# Patient Record
Sex: MALE | Race: WHITE
[De-identification: names, ages, dates, MRNs, and addresses within clinical notes are randomized per-mention and may not be internally consistent; named-entity substitution may affect disease eponyms.]

---

## 2018-09-19 ENCOUNTER — HOSPITAL ENCOUNTER (EMERGENCY)
Dept: HOSPITAL 25 - ED | Age: 48
Discharge: HOME | End: 2018-09-19
Payer: COMMERCIAL

## 2018-09-19 VITALS — DIASTOLIC BLOOD PRESSURE: 112 MMHG | SYSTOLIC BLOOD PRESSURE: 160 MMHG
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
## 2018-09-19 DIAGNOSIS — Z88.0: ICD-10-CM
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
## 2018-09-19 DIAGNOSIS — S05.01XA: Primary | ICD-10-CM

## 2018-09-19 PROCEDURE — 99281 EMR DPT VST MAYX REQ PHY/QHP: CPT

## 2018-09-19 NOTE — ED
Throat Pain/Nasal Congestion





- HPI Summary


HPI Summary: 


Patient is a 47-year-old otherwise healthy male presenting to the ED with right 

eye pain 2 days.  He states he felt something fly into the eye (such as an 

insect) 2 days ago and immediately felt pain.  He states this resolved after 

several minutes, and the following day symptoms worsened with erythema, 

injection and tearing from the eye.  He states after irrigating the eye, he saw 

a black foreign body come from the eye and again felt improved.  However when 

he awoke this morning, symptoms returned.  Pain is not worse or better with 

blinking, not worse or better with light.  He was wearing contacts at the time 

but has been using glasses past 2 days after he felt symptoms.








- History of Current Complaint


Chief Complaint: EDEyeProblem


Time Seen by Provider: 09/19/18 21:28


Hx Obtained From: Patient


Onset/Duration: Sudden Onset


Severity: Moderate





- Epiglottits Risk Factors


Epiglottis Risk Factors: Negative





- Allergies/Home Medications


Allergies/Adverse Reactions: 


 Allergies











Allergy/AdvReac Type Severity Reaction Status Date / Time


 


Penicillins Allergy  Anaphylatic Verified 09/19/18 20:52





   Shock  














PMH/Surg Hx/FS Hx/Imm Hx


Previously Healthy: Yes





- Immunization History


Hx Pertussis Vaccination: No


Immunizations Up to Date: Yes


Infectious Disease History: No


Infectious Disease History: 


   Denies: Traveled Outside the [place] in Last 30 Days





- Social History


Occupation: Employed Full-time


Lives: With Family


Alcohol Use: Occasionally


Hx Substance Use: No


Substance Use Type: Reports: None


Hx Tobacco Use: No


Smoking Status (MU): Never Smoked Tobacco





Review of Systems


Constitutional: Negative


Negative: Fever, Chills, Skin Diaphoresis


Positive: Photophobia, Blurred Vision, Drainage, Erythema.  Negative: Diplopia


Negative: Palpitations, Chest Pain


Negative: Shortness Of Breath, Cough


Genitourinary: Negative


Positive: no symptoms reported, see HPI


Negative: Rash, Bruising


Neurological: Negative


All Other Systems Reviewed And Are Negative: Yes





Physical Exam


Triage Information Reviewed: Yes


Vital Signs On Initial Exam: 


 Initial Vitals











Temp Pulse Resp BP Pulse Ox


 


 98.6 F   80   18   160/112   98 


 


 09/19/18 20:46  09/19/18 20:46  09/19/18 20:46  09/19/18 20:46  09/19/18 20:46











Vital Signs Reviewed: Yes


Appearance: Positive: Well-Appearing, No Pain Distress


Skin: Positive: Warm, Skin Color Reflects Adequate Perfusion


Head/Face: Positive: Normal Head/Face Inspection


Eyes: Positive: Conjunctiva Inflammed


Neck: Positive: Nontender, No Lymphadenopathy


Respiratory/Lung Sounds: Positive: Clear to Auscultation, Breath Sounds Present


Cardiovascular: Positive: RRR, Pulses are Symmetrical in both Upper and Lower 

Extremities


Musculoskeletal: Positive: Normal, Strength/ROM Intact


Neurological: Positive: Speech Normal


Psychiatric: Positive: Affect/Mood Appropriate


AVPU Assessment: Alert





Diagnostics





- Vital Signs


 Vital Signs











  Temp Pulse Resp BP Pulse Ox


 


 09/19/18 20:46  98.6 F  80  18  160/112  98














- Laboratory


Lab Statement: Any lab studies that have been ordered have been reviewed, and 

results considered in the medical decision making process.





EENT Course/Dx





- Course


Course Of Treatment: During the course treatment, the patient is evaluated for 

right eye pain, tearing and conjunctival injection.  Tetracaine and ful-sergo 

tibiae with Wood's lamp.  No foreign body visualized.  No corneal abrasion, 

however there was a small amount of uptake to the lower portion of the eye 

which could resemble a small abrasion.  He currently has an appointment with 

his ophthalmologist in 2 days.  He is given polymyxin trimethoprim drops for 

every 3 hours until follow-up.  He is encouraged to return to the ED if any 

symptoms worsen.





- Differential Diagnoses


Differential Diagnoses: Corneal Abrasion, Other - Foreign body in the eye





- Diagnoses


Provider Diagnoses: 


 Corneal abrasion








Discharge





- Sign-Out/Discharge


Documenting (check all that apply): Patient Departure





- Discharge Plan


Condition: Stable


Disposition: HOME


Referrals: 


No Primary Care Phys,NOPCP [Primary Care Provider] - 


Additional Instructions: 


You may continue with tylenol and ibuprofen intermittently for discomfort


If symptoms worsen, go to the ED


Keep your follow-up appointment with your ophthalmologist on Friday





- Billing Disposition and Condition


Condition: STABLE


Disposition: Home